# Patient Record
Sex: FEMALE | Race: WHITE | NOT HISPANIC OR LATINO | Employment: OTHER | URBAN - METROPOLITAN AREA
[De-identification: names, ages, dates, MRNs, and addresses within clinical notes are randomized per-mention and may not be internally consistent; named-entity substitution may affect disease eponyms.]

---

## 2017-10-30 RX ORDER — OMEPRAZOLE 20 MG/1
CAPSULE, DELAYED RELEASE ORAL
Qty: 90 CAPSULE | Refills: 4 | OUTPATIENT
Start: 2017-10-30

## 2018-03-22 ENCOUNTER — OFFICE VISIT (OUTPATIENT)
Dept: NEUROLOGY | Facility: CLINIC | Age: 78
End: 2018-03-22

## 2018-03-22 VITALS
OXYGEN SATURATION: 96 % | SYSTOLIC BLOOD PRESSURE: 118 MMHG | RESPIRATION RATE: 18 BRPM | BODY MASS INDEX: 29.88 KG/M2 | HEIGHT: 64 IN | DIASTOLIC BLOOD PRESSURE: 70 MMHG | WEIGHT: 175 LBS | HEART RATE: 70 BPM

## 2018-03-22 DIAGNOSIS — G43.C0 PERIODIC HEADACHE SYNDROME, NOT INTRACTABLE: Primary | ICD-10-CM

## 2018-03-22 PROCEDURE — 99204 OFFICE O/P NEW MOD 45 MIN: CPT | Performed by: PSYCHIATRY & NEUROLOGY

## 2018-03-22 RX ORDER — OMEPRAZOLE 40 MG/1
40 CAPSULE, DELAYED RELEASE ORAL DAILY
COMMUNITY

## 2018-03-22 RX ORDER — ROPINIROLE 2 MG/1
TABLET, FILM COATED ORAL
COMMUNITY
Start: 2018-03-21

## 2018-03-22 RX ORDER — PREDNISONE 1 MG/1
5 TABLET ORAL DAILY
COMMUNITY

## 2018-03-22 NOTE — PROGRESS NOTES
Subjective   Patient ID: Diana Hernandez is a 78 y.o. female     Chief Complaint   Patient presents with   • Numbness        History of Present Illness    78 y.o. female presents for evaluation of numbness and tingling in skull.  Develops pins and needles sensation in back of head for 3 - 4 months.  Timing is intermittent on a daily basis.  Lasts for 10 - 15 minutes.  Mild dizziness.  Mild HA once with tingling.  Intensity is mild.  No alleviating or modifying factors.  Touching does not change sx.         Past Medical History:   Diagnosis Date   • Cancer    • RA (rheumatoid arthritis)      Family History   Problem Relation Age of Onset   • Cancer Mother      Breast     Social History     Social History   • Marital status:      Social History Main Topics   • Smoking status: Never Smoker   • Alcohol use No   • Drug use: No   • Sexual activity: Defer     Other Topics Concern   • Not on file       Review of Systems   Constitutional: Positive for fatigue. Negative for activity change and unexpected weight change.   HENT: Negative for tinnitus and trouble swallowing.    Eyes: Negative for photophobia and visual disturbance.   Respiratory: Negative for apnea, cough and choking.    Cardiovascular: Negative for leg swelling.   Gastrointestinal: Negative for nausea and vomiting.   Endocrine: Negative for cold intolerance and heat intolerance.   Genitourinary: Negative for difficulty urinating, frequency, menstrual problem and urgency.   Musculoskeletal: Positive for arthralgias and joint swelling. Negative for back pain, gait problem, myalgias and neck pain.   Skin: Negative for color change and rash.   Allergic/Immunologic: Negative for immunocompromised state.   Neurological: Positive for numbness and headaches. Negative for dizziness, tremors, seizures, syncope, facial asymmetry, speech difficulty, weakness and light-headedness.   Hematological: Negative for adenopathy. Does not bruise/bleed easily.  "  Psychiatric/Behavioral: Negative for behavioral problems, confusion, decreased concentration, hallucinations and sleep disturbance.       Objective     Vitals:    03/22/18 1457   BP: 118/70   BP Location: Left arm   Patient Position: Sitting   Cuff Size: Adult   Pulse: 70   Resp: 18   SpO2: 96%   Weight: 79.4 kg (175 lb)   Height: 162.6 cm (64\")       Neurologic Exam     Mental Status   Oriented to person, place, and time.   Registration: recalls 3 of 3 objects. Recall at 5 minutes: recalls 3 of 3 objects. Follows 3 step commands.   Attention: normal. Concentration: normal.   Speech: speech is normal   Level of consciousness: alert  Knowledge: good and consistent with education.   Able to name object. Able to read. Able to repeat. Able to write. Normal comprehension.     Cranial Nerves     CN II   Visual fields full to confrontation.   Visual acuity: normal  Right visual field deficit: none  Left visual field deficit: none     CN III, IV, VI   Pupils are equal, round, and reactive to light.  Extraocular motions are normal.   Right pupil: Shape: regular. Reactivity: brisk. Consensual response: intact.   Left pupil: Shape: regular. Reactivity: brisk. Consensual response: intact.   Nystagmus: none   Diplopia: none  Ophthalmoparesis: none  Upgaze: normal  Downgaze: normal  Conjugate gaze: present  Vestibulo-ocular reflex: present    CN V   Facial sensation intact.   Right corneal reflex: normal  Left corneal reflex: normal    CN VII   Right facial weakness: none  Left facial weakness: none    CN VIII   Hearing: intact    CN IX, X   Palate: symmetric  Right gag reflex: normal  Left gag reflex: normal    CN XI   Right sternocleidomastoid strength: normal  Left sternocleidomastoid strength: normal    CN XII   Tongue: not atrophic  Fasciculations: absent  Tongue deviation: none    Motor Exam   Muscle bulk: normal  Overall muscle tone: normal  Right arm tone: normal  Left arm tone: normal  Right leg tone: normal  Left leg " tone: normal    Strength   Strength 5/5 throughout.     Sensory Exam   Light touch normal.   Vibration normal.   Proprioception normal.   Pinprick normal.     Gait, Coordination, and Reflexes     Gait  Gait: normal    Coordination   Romberg: negative  Finger to nose coordination: normal  Heel to shin coordination: normal  Tandem walking coordination: normal    Tremor   Resting tremor: absent  Intention tremor: absent  Action tremor: absent    Reflexes   Reflexes 2+ except as noted.       Physical Exam   Constitutional: She is oriented to person, place, and time. Vital signs are normal. She appears well-developed and well-nourished.   HENT:   Head: Normocephalic and atraumatic.   Eyes: EOM and lids are normal. Pupils are equal, round, and reactive to light.   Fundoscopic exam:       The right eye shows no exudate, no hemorrhage and no papilledema. The right eye shows venous pulsations.        The left eye shows no exudate, no hemorrhage and no papilledema. The left eye shows venous pulsations.   Neck: Normal range of motion and phonation normal. Normal carotid pulses present. Carotid bruit is not present. No thyroid mass and no thyromegaly present.   Cardiovascular: Normal rate, regular rhythm and normal heart sounds.    Pulmonary/Chest: Effort normal.   Neurological: She is oriented to person, place, and time. She has normal strength. She has a normal Finger-Nose-Finger Test, a normal Heel to Shin Test, a normal Romberg Test and a normal Tandem Gait Test. Gait normal.   Skin: Skin is warm and dry.   Psychiatric: She has a normal mood and affect. Her speech is normal.   Nursing note and vitals reviewed.      No results found for any previous visit.         Assessment/Plan     Problem List Items Addressed This Visit        Cardiovascular and Mediastinum    Periodic headache syndrome, not intractable - Primary    Current Assessment & Plan     Headaches are newly identified.  Further diagnostic evaluation per orders.      Check MRI Brain for new onset HA, dizziness and sensory distrubances                Other Visit Diagnoses    None.            No Follow-up on file.

## 2018-03-22 NOTE — ASSESSMENT & PLAN NOTE
Headaches are newly identified.  Further diagnostic evaluation per orders.     Check MRI Brain for new onset HA, dizziness and sensory distrubances

## 2024-05-19 ENCOUNTER — APPOINTMENT (OUTPATIENT)
Facility: HOSPITAL | Age: 84
End: 2024-05-19
Payer: MEDICARE

## 2024-05-19 ENCOUNTER — HOSPITAL ENCOUNTER (EMERGENCY)
Facility: HOSPITAL | Age: 84
Discharge: HOME | End: 2024-05-19
Attending: EMERGENCY MEDICINE | Admitting: EMERGENCY MEDICINE
Payer: MEDICARE

## 2024-05-19 VITALS
HEIGHT: 64 IN | SYSTOLIC BLOOD PRESSURE: 167 MMHG | TEMPERATURE: 98.6 F | OXYGEN SATURATION: 90 % | DIASTOLIC BLOOD PRESSURE: 90 MMHG | HEART RATE: 75 BPM | BODY MASS INDEX: 25.61 KG/M2 | WEIGHT: 150 LBS | RESPIRATION RATE: 18 BRPM

## 2024-05-19 DIAGNOSIS — R11.2 NAUSEA AND VOMITING, UNSPECIFIED VOMITING TYPE: Primary | ICD-10-CM

## 2024-05-19 DIAGNOSIS — K44.9 HIATAL HERNIA: ICD-10-CM

## 2024-05-19 LAB
ALBUMIN SERPL-MCNC: 4.4 G/DL (ref 3.5–5.2)
ALBUMIN/GLOB SERPL: 1.6 G/DL
ALP SERPL-CCNC: 70 U/L (ref 39–117)
ALT SERPL W P-5'-P-CCNC: 12 U/L (ref 1–33)
ANION GAP SERPL CALCULATED.3IONS-SCNC: 14.1 MMOL/L (ref 5–15)
AST SERPL-CCNC: 25 U/L (ref 1–32)
BASOPHILS # BLD AUTO: 0.03 10*3/MM3 (ref 0–0.2)
BASOPHILS NFR BLD AUTO: 0.3 % (ref 0–1.5)
BILIRUB SERPL-MCNC: 0.4 MG/DL (ref 0–1.2)
BUN SERPL-MCNC: 16 MG/DL (ref 8–23)
BUN/CREAT SERPL: 21.3 (ref 7–25)
CALCIUM SPEC-SCNC: 9.7 MG/DL (ref 8.6–10.5)
CHLORIDE SERPL-SCNC: 99 MMOL/L (ref 98–107)
CO2 SERPL-SCNC: 24.9 MMOL/L (ref 22–29)
CREAT SERPL-MCNC: 0.75 MG/DL (ref 0.57–1)
D-LACTATE SERPL-SCNC: 1.6 MMOL/L (ref 0.5–2)
DEPRECATED RDW RBC AUTO: 50.2 FL (ref 37–54)
EGFRCR SERPLBLD CKD-EPI 2021: 77.6 ML/MIN/1.73
EOSINOPHIL # BLD AUTO: 0.06 10*3/MM3 (ref 0–0.4)
EOSINOPHIL NFR BLD AUTO: 0.6 % (ref 0.3–6.2)
ERYTHROCYTE [DISTWIDTH] IN BLOOD BY AUTOMATED COUNT: 14.1 % (ref 12.3–15.4)
GLOBULIN UR ELPH-MCNC: 2.8 GM/DL
GLUCOSE SERPL-MCNC: 130 MG/DL (ref 65–99)
HCT VFR BLD AUTO: 39.7 % (ref 34–46.6)
HGB BLD-MCNC: 13.5 G/DL (ref 12–15.9)
HOLD SPECIMEN: NORMAL
IMM GRANULOCYTES # BLD AUTO: 0.01 10*3/MM3 (ref 0–0.05)
IMM GRANULOCYTES NFR BLD AUTO: 0.1 % (ref 0–0.5)
LIPASE SERPL-CCNC: 77 U/L (ref 13–60)
LYMPHOCYTES # BLD AUTO: 5.37 10*3/MM3 (ref 0.7–3.1)
LYMPHOCYTES NFR BLD AUTO: 52.4 % (ref 19.6–45.3)
MCH RBC QN AUTO: 32.7 PG (ref 26.6–33)
MCHC RBC AUTO-ENTMCNC: 34 G/DL (ref 31.5–35.7)
MCV RBC AUTO: 96.1 FL (ref 79–97)
MONOCYTES # BLD AUTO: 0.88 10*3/MM3 (ref 0.1–0.9)
MONOCYTES NFR BLD AUTO: 8.6 % (ref 5–12)
NEUTROPHILS NFR BLD AUTO: 3.89 10*3/MM3 (ref 1.7–7)
NEUTROPHILS NFR BLD AUTO: 38 % (ref 42.7–76)
PLATELET # BLD AUTO: 240 10*3/MM3 (ref 140–450)
PMV BLD AUTO: 9.8 FL (ref 6–12)
POTASSIUM SERPL-SCNC: 3.9 MMOL/L (ref 3.5–5.2)
PROT SERPL-MCNC: 7.2 G/DL (ref 6–8.5)
RBC # BLD AUTO: 4.13 10*6/MM3 (ref 3.77–5.28)
SODIUM SERPL-SCNC: 138 MMOL/L (ref 136–145)
TROPONIN T SERPL HS-MCNC: 13 NG/L
WBC NRBC COR # BLD AUTO: 10.24 10*3/MM3 (ref 3.4–10.8)
WHOLE BLOOD HOLD SPECIMEN: NORMAL

## 2024-05-19 PROCEDURE — 25010000002 METOCLOPRAMIDE PER 10 MG: Performed by: EMERGENCY MEDICINE

## 2024-05-19 PROCEDURE — 96376 TX/PRO/DX INJ SAME DRUG ADON: CPT

## 2024-05-19 PROCEDURE — 96375 TX/PRO/DX INJ NEW DRUG ADDON: CPT

## 2024-05-19 PROCEDURE — 83605 ASSAY OF LACTIC ACID: CPT | Performed by: EMERGENCY MEDICINE

## 2024-05-19 PROCEDURE — 84484 ASSAY OF TROPONIN QUANT: CPT | Performed by: EMERGENCY MEDICINE

## 2024-05-19 PROCEDURE — 74177 CT ABD & PELVIS W/CONTRAST: CPT

## 2024-05-19 PROCEDURE — 25510000001 IOPAMIDOL 61 % SOLUTION: Performed by: EMERGENCY MEDICINE

## 2024-05-19 PROCEDURE — 93005 ELECTROCARDIOGRAM TRACING: CPT | Performed by: EMERGENCY MEDICINE

## 2024-05-19 PROCEDURE — 83690 ASSAY OF LIPASE: CPT | Performed by: EMERGENCY MEDICINE

## 2024-05-19 PROCEDURE — 25810000003 LACTATED RINGERS SOLUTION: Performed by: EMERGENCY MEDICINE

## 2024-05-19 PROCEDURE — 85025 COMPLETE CBC W/AUTO DIFF WBC: CPT | Performed by: EMERGENCY MEDICINE

## 2024-05-19 PROCEDURE — 25010000002 ONDANSETRON PER 1 MG: Performed by: EMERGENCY MEDICINE

## 2024-05-19 PROCEDURE — 99285 EMERGENCY DEPT VISIT HI MDM: CPT

## 2024-05-19 PROCEDURE — 96374 THER/PROPH/DIAG INJ IV PUSH: CPT

## 2024-05-19 PROCEDURE — 80053 COMPREHEN METABOLIC PANEL: CPT | Performed by: EMERGENCY MEDICINE

## 2024-05-19 PROCEDURE — 25010000002 MORPHINE PER 10 MG: Performed by: EMERGENCY MEDICINE

## 2024-05-19 RX ORDER — ONDANSETRON 4 MG/1
4 TABLET, ORALLY DISINTEGRATING ORAL EVERY 6 HOURS PRN
Qty: 12 TABLET | Refills: 0 | Status: SHIPPED | OUTPATIENT
Start: 2024-05-19

## 2024-05-19 RX ORDER — METOCLOPRAMIDE 10 MG/1
10 TABLET ORAL
Qty: 12 TABLET | Refills: 0 | Status: SHIPPED | OUTPATIENT
Start: 2024-05-19 | End: 2024-05-22

## 2024-05-19 RX ORDER — FAMOTIDINE 10 MG/ML
20 INJECTION, SOLUTION INTRAVENOUS ONCE
Status: COMPLETED | OUTPATIENT
Start: 2024-05-19 | End: 2024-05-19

## 2024-05-19 RX ORDER — METOCLOPRAMIDE HYDROCHLORIDE 5 MG/ML
10 INJECTION INTRAMUSCULAR; INTRAVENOUS ONCE
Status: COMPLETED | OUTPATIENT
Start: 2024-05-19 | End: 2024-05-19

## 2024-05-19 RX ORDER — ONDANSETRON 2 MG/ML
4 INJECTION INTRAMUSCULAR; INTRAVENOUS ONCE
Status: COMPLETED | OUTPATIENT
Start: 2024-05-19 | End: 2024-05-19

## 2024-05-19 RX ORDER — ALUMINA, MAGNESIA, AND SIMETHICONE 2400; 2400; 240 MG/30ML; MG/30ML; MG/30ML
15 SUSPENSION ORAL ONCE
Status: COMPLETED | OUTPATIENT
Start: 2024-05-19 | End: 2024-05-19

## 2024-05-19 RX ADMIN — SODIUM CHLORIDE, POTASSIUM CHLORIDE, SODIUM LACTATE AND CALCIUM CHLORIDE 1000 ML: 600; 310; 30; 20 INJECTION, SOLUTION INTRAVENOUS at 15:19

## 2024-05-19 RX ADMIN — MORPHINE SULFATE 4 MG: 4 INJECTION, SOLUTION INTRAMUSCULAR; INTRAVENOUS at 16:20

## 2024-05-19 RX ADMIN — IOPAMIDOL 100 ML: 612 INJECTION, SOLUTION INTRAVENOUS at 15:49

## 2024-05-19 RX ADMIN — METOCLOPRAMIDE HYDROCHLORIDE 10 MG: 5 INJECTION INTRAMUSCULAR; INTRAVENOUS at 16:24

## 2024-05-19 RX ADMIN — MORPHINE SULFATE 4 MG: 4 INJECTION, SOLUTION INTRAMUSCULAR; INTRAVENOUS at 15:18

## 2024-05-19 RX ADMIN — ALUMINUM HYDROXIDE, MAGNESIUM HYDROXIDE, AND DIMETHICONE 15 ML: 400; 400; 40 SUSPENSION ORAL at 15:19

## 2024-05-19 RX ADMIN — ONDANSETRON 4 MG: 2 INJECTION INTRAMUSCULAR; INTRAVENOUS at 15:19

## 2024-05-19 RX ADMIN — FAMOTIDINE 20 MG: 10 INJECTION, SOLUTION INTRAVENOUS at 15:19

## 2024-05-19 NOTE — FSED PROVIDER NOTE
"Subjective  History of Present Illness:  Patient presents to the emergency department with a sudden onset of nausea, vomiting and burning in her chest.  She states that she has had associated generalized abdominal tenderness.  States she has a history of similar problems in the past and describes esophageal strictures.  She states that she has not seen her GI doctor in 2 years.  She describes persistent vomiting since approximately noon.  Denies any urinary symptoms.      Nurses Notes reviewed and agree, including vitals, allergies, social history and prior medical history.     REVIEW OF SYSTEMS: All systems reviewed and not pertinent unless noted.  Review of Systems    Past Medical History:   Diagnosis Date    Cancer     RA (rheumatoid arthritis)        Allergies:    Codeine and Elemental sulfur      History reviewed. No pertinent surgical history.      Social History     Socioeconomic History    Marital status:    Tobacco Use    Smoking status: Never   Substance and Sexual Activity    Alcohol use: No    Drug use: No    Sexual activity: Defer         Family History   Problem Relation Age of Onset    Cancer Mother         Breast       Objective  Physical Exam:  /90   Pulse 65   Temp 98.6 °F (37 °C)   Resp 18   Ht 162.6 cm (64\")   Wt 68 kg (150 lb)   SpO2 99%   BMI 25.75 kg/m²      Physical Exam    ECG 12 Lead Chest Pain      Date/Time: 5/19/2024 3:58 PM    Performed by: Ruddy Araujo DO  Authorized by: Ruddy Araujo DO  Interpreted by ED physician  Rhythm: sinus rhythm  Rate: normal  QRS axis: left  ST Segments: ST segments normal  Other findings: LVH  Clinical impression: normal ECG        ED Course:         Lab Results (last 24 hours)       Procedure Component Value Units Date/Time    Comprehensive Metabolic Panel [[id number]]  (Abnormal) Collected: 05/19/24 1505    Specimen: Blood Updated: 05/19/24 1529     Glucose 130 mg/dL      BUN 16 mg/dL      Creatinine 0.75 mg/dL      Sodium " 138 mmol/L      Potassium 3.9 mmol/L      Chloride 99 mmol/L      CO2 24.9 mmol/L      Calcium 9.7 mg/dL      Total Protein 7.2 g/dL      Albumin 4.4 g/dL      ALT (SGPT) 12 U/L      AST (SGOT) 25 U/L      Alkaline Phosphatase 70 U/L      Total Bilirubin 0.4 mg/dL      Globulin 2.8 gm/dL      A/G Ratio 1.6 g/dL      BUN/Creatinine Ratio 21.3     Anion Gap 14.1 mmol/L      eGFR 77.6 mL/min/1.73     Narrative:      GFR Normal >60  Chronic Kidney Disease <60  Kidney Failure <15    The GFR formula is only valid for adults with stable renal function between ages 18 and 70.    CBC & Differential [[id number]]  (Abnormal) Collected: 05/19/24 1505    Specimen: Blood Updated: 05/19/24 1510    Narrative:      The following orders were created for panel order CBC & Differential.  Procedure                               Abnormality         Status                     ---------                               -----------         ------                     CBC Auto Differential[[id number]]        Abnormal            Final result                 Please view results for these tests on the individual orders.    CBC Auto Differential [[id number]]  (Abnormal) Collected: 05/19/24 1505    Specimen: Blood Updated: 05/19/24 1510     WBC 10.24 10*3/mm3      RBC 4.13 10*6/mm3      Hemoglobin 13.5 g/dL      Hematocrit 39.7 %      MCV 96.1 fL      MCH 32.7 pg      MCHC 34.0 g/dL      RDW 14.1 %      RDW-SD 50.2 fl      MPV 9.8 fL      Platelets 240 10*3/mm3      Neutrophil % 38.0 %      Lymphocyte % 52.4 %      Monocyte % 8.6 %      Eosinophil % 0.6 %      Basophil % 0.3 %      Immature Grans % 0.1 %      Neutrophils, Absolute 3.89 10*3/mm3      Lymphocytes, Absolute 5.37 10*3/mm3      Monocytes, Absolute 0.88 10*3/mm3      Eosinophils, Absolute 0.06 10*3/mm3      Basophils, Absolute 0.03 10*3/mm3      Immature Grans, Absolute 0.01 10*3/mm3     Lipase [[id number]]  (Abnormal) Collected: 05/19/24 1505    Specimen: Blood Updated: 05/19/24 1516      Lipase 77 U/L     Lactic Acid, Plasma [[id number]]  (Normal) Collected: 05/19/24 1505    Specimen: Blood Updated: 05/19/24 1529     Lactate 1.6 mmol/L     Single High Sensitivity Troponin T [[id number]]  (Normal) Collected: 05/19/24 1505    Specimen: Blood Updated: 05/19/24 1549     HS Troponin T 13 ng/L              CT Abdomen Pelvis With Contrast    Result Date: 5/19/2024  CT ABDOMEN PELVIS W CONTRAST Date of Exam: 5/19/2024 3:36 PM EDT Indication: abd pain, N/V. Comparison: None available. Technique: Axial CT images were obtained of the abdomen and pelvis following the uneventful intravenous administration of 100 mL Isovue-300. Reconstructed coronal and sagittal images were also obtained. Automated exposure control and iterative construction methods were used. Findings: LUNG BASES: There is left lower lobe airspace disease and volume loss with mild left pleural effusion. Loculated pleural fluid collection is of concern and suspected. There is a large hiatal hernia noted. The heart appears prominent in size with shifting  of the mediastinum into the left hemithorax. LIVER:  Unremarkable parenchyma without focal lesion. BILIARY/GALLBLADDER:  Unremarkable SPLEEN:  Unremarkable PANCREAS:  Unremarkable ADRENAL:  Unremarkable KIDNEYS:  Unremarkable parenchyma with no solid mass identified. No obstruction.  No calculus identified. Right renal cortical based cysts are noted. GASTROINTESTINAL/MESENTERY: Large hiatal hernia with the majority of the gastric fundus protruding into the thoracic cavity. Evaluation of the remaining gastrointestinal tract demonstrates diverticulosis without CT evidence for acute diverticulitis. No bowel wall thickening or evidence for obstructive changes. Fecal stasis is noted compatible with constipation. There is an umbilical hernia containing mesenteric fat and trace fluid. MESENTERIC VESSELS:  Patent. AORTA/IVC:  Normal caliber. RETROPERITONEUM/LYMPH NODES:  Unremarkable REPRODUCTIVE: The  uterus is atrophied. There is trace fluid which appears physiologic. BLADDER:  Unremarkable OSSEUS STRUCTURES: There is anterior listhesis of L5 on S1. Intervertebral disc base narrowing and facet changes are present at levels L4-S1.     Impression: Impression: 1. Left lower lobe airspace disease concerning for pneumonia in the setting of elevated white count and fever with trace suspected loculated effusion. 2. Large hiatal hernia. 3. Diverticulosis and constipation. Electronically Signed: Sherlyn Bae MD  5/19/2024 4:01 PM EDT  Workstation ID: [id number]        [facility]  Number of Diagnoses or Management Options  Diagnosis management comments: Patient is evaluated labs are drawn.  The patient's labs and workup are fairly benign.  She is found to have a very large hiatal hernia but there were no other significant findings on her CT scan.  She continued to have nausea and vomiting and was given Reglan which seemed to help her the most.  She was discharged home with Reglan, Zofran and will follow-up with Dr. Troy on an outpatient basis in [place]       Amount and/or Complexity of Data Reviewed  Clinical lab tests: reviewed        Medications   ondansetron (ZOFRAN) injection 4 mg (4 mg Intravenous Given 5/19/24 1519)   famotidine (PEPCID) injection 20 mg (20 mg Intravenous Given 5/19/24 1519)   aluminum-magnesium hydroxide-simethicone (MAALOX MAX) 400-400-40 MG/5ML suspension 15 mL (15 mL Oral Given 5/19/24 1519)   lactated ringers bolus 1,000 mL (1,000 mL Intravenous New Bag 5/19/24 1519)   morphine injection 4 mg (4 mg Intravenous Given 5/19/24 1518)   iopamidol (ISOVUE-300) 61 % injection 100 mL (100 mL Intravenous Given 5/19/24 1549)   morphine injection 4 mg (4 mg Intravenous Given 5/19/24 1620)   metoclopramide (REGLAN) injection 10 mg (10 mg Intravenous Given 5/19/24 1624)       Data interpreted: Nursing notes reviewed, vital signs reviewed.  Labs independently interpreted by me (CBC, CMP, lipase, UA,  troponin, ABG, lactic acid, procalcitonin).  Imaging independently interpreted by me (x-ray, CT scan).  EKG independently interpreted by me.  O2 saturation:    Counseling: Discussed the results above with the patient regarding need for admission or discharge.  Patient understands and agrees plan of care.      -----  ED Disposition       ED Disposition   Discharge    Condition   Stable    Comment   --             Final diagnoses:   Nausea and vomiting, unspecified vomiting type   Hiatal hernia      Your Follow-Up Providers       Kayden Troy MD. Call in 3 days.    Specialty: Gastroenterology  [address] [place] [address] [address]  [facility] [address] [phone]                       Contact information for after-discharge care    Follow-up information has not been specified.                    Your medication list        START taking these medications        Instructions Last Dose Given Next Dose Due   metoclopramide 10 MG tablet  Commonly known as: Reglan      Take 1 tablet by mouth 4 (Four) Times a Day Before Meals & at Bedtime for 12 doses.       ondansetron ODT 4 MG disintegrating tablet  Commonly known as: ZOFRAN-ODT      Take 1 tablet by mouth Every 6 (Six) Hours As Needed for Nausea or Vomiting for up to 12 doses.              CONTINUE taking these medications        Instructions Last Dose Given Next Dose Due   methotrexate 2.5 MG tablet           omeprazole 40 MG capsule  Commonly known as: priLOSEC      Take 40 mg by mouth Daily.       predniSONE 5 MG tablet  Commonly known as: DELTASONE      Take 5 mg by mouth Daily.       rOPINIRole 2 MG tablet  Commonly known as: REQUIP           ZETIA PO      Take  by mouth.                 Where to Get Your Medications        These medications were sent to [facility]/pharmacy #[id number] - [place], [place] - [address]  - [phone]  - [phone]   [address] , [facility] [address]      Hours: 24-hours Phone: [phone]   metoclopramide 10 MG  tablet  ondansetron ODT 4 MG disintegrating tablet

## 2024-05-21 LAB
QT INTERVAL: 434 MS
QTC INTERVAL: 475 MS

## 2024-06-27 LAB
QT INTERVAL: 434 MS
QTC INTERVAL: 475 MS